# Patient Record
Sex: FEMALE | URBAN - METROPOLITAN AREA
[De-identification: names, ages, dates, MRNs, and addresses within clinical notes are randomized per-mention and may not be internally consistent; named-entity substitution may affect disease eponyms.]

---

## 2021-04-30 ENCOUNTER — OFFICE VISIT (OUTPATIENT)
Dept: URBAN - METROPOLITAN AREA CLINIC 10 | Facility: CLINIC | Age: 21
End: 2021-04-30
Payer: COMMERCIAL

## 2021-04-30 DIAGNOSIS — H02.9 DISORDER OF EYELID: Primary | ICD-10-CM

## 2021-04-30 PROCEDURE — 99203 OFFICE O/P NEW LOW 30 MIN: CPT | Performed by: OPHTHALMOLOGY

## 2021-04-30 ASSESSMENT — INTRAOCULAR PRESSURE
OD: 13
OS: 12

## 2021-04-30 ASSESSMENT — VISUAL ACUITY
OD: 20/20
OS: 20/20

## 2021-04-30 ASSESSMENT — KERATOMETRY
OD: 42.88
OS: 43.13

## 2021-04-30 NOTE — IMPRESSION/PLAN
Impression: Disorder of eyelid: H02.9. Plan: suspect resolving upper lid lesions, was on antibiotics. now first trimester pregnancy, will defer further antibiotics, suggest warm compresses and artificial tears. no obvious rosacea or significant meibomian gland inspissation though. 
[facility] consult with oculoplastics team prn

## 2021-05-10 ENCOUNTER — OFFICE VISIT (OUTPATIENT)
Dept: URBAN - METROPOLITAN AREA CLINIC 10 | Facility: CLINIC | Age: 21
End: 2021-05-10
Payer: COMMERCIAL

## 2021-05-10 PROCEDURE — 92002 INTRM OPH EXAM NEW PATIENT: CPT | Performed by: OPTOMETRIST

## 2021-06-14 ENCOUNTER — OFFICE VISIT (OUTPATIENT)
Dept: URBAN - METROPOLITAN AREA CLINIC 10 | Facility: CLINIC | Age: 21
End: 2021-06-14
Payer: COMMERCIAL

## 2021-06-14 DIAGNOSIS — H10.45 OTHER CHRONIC ALLERGIC CONJUNCTIVITIS: Primary | ICD-10-CM

## 2021-06-14 DIAGNOSIS — H02.35 BLEPHAROCHALASIS LEFT LOWER EYELID: ICD-10-CM

## 2021-06-14 DIAGNOSIS — H02.31 BLEPHAROCHALASIS RIGHT UPPER EYELID: ICD-10-CM

## 2021-06-14 PROCEDURE — 92012 INTRM OPH EXAM EST PATIENT: CPT | Performed by: OPTOMETRIST

## 2021-06-14 NOTE — IMPRESSION/PLAN
Impression: Neoplasm of uncertain behavior of connective tissue of eyelid: D48.1. Plan: Lid lesion RUL. Consult Dr. Charley Rey as scheduled.

## 2021-06-14 NOTE — IMPRESSION/PLAN
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 Plan: Improved. Cont. cool compresses and aquaphor. Pt. is pregnant.

## 2021-07-20 ENCOUNTER — OFFICE VISIT (OUTPATIENT)
Dept: URBAN - METROPOLITAN AREA CLINIC 34 | Facility: CLINIC | Age: 21
End: 2021-07-20
Payer: COMMERCIAL

## 2021-07-20 DIAGNOSIS — D48.1 NEOPLASM OF UNCERTAIN BEHAVIOR OF CONNECTIVE TISSUE OF EYELID: Primary | ICD-10-CM

## 2021-07-20 PROCEDURE — 92014 COMPRE OPH EXAM EST PT 1/>: CPT | Performed by: OPHTHALMOLOGY

## 2021-07-20 NOTE — IMPRESSION/PLAN
Impression: Neoplasm of uncertain behavior of connective tissue of eyelid: D48.1. Plan: Right upper lid/ lateral brow- subcutaneous nodule. Nodule fluctuates in size and also has intermittent episodes of upper eyelid edema that began 7 months ago. Edema lasts 2-3 days and then subsides. No changes in vision. On exam, no lid edema today, but she does have a nodule at the area of the frontozygomatic suture. Will schedule for biopsy. RBAI of biopsy/excision d/w patient, and all questions answered. 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

## 2021-08-26 ENCOUNTER — PROCEDURE (OUTPATIENT)
Dept: URBAN - METROPOLITAN AREA CLINIC 44 | Facility: CLINIC | Age: 21
End: 2021-08-26
Payer: COMMERCIAL

## 2021-08-26 PROCEDURE — 67840 REMOVE EYELID LESION: CPT | Performed by: OPHTHALMOLOGY

## 2021-08-26 RX ORDER — NEOMYCIN SULFATE, POLYMYXIN B SULFATE AND DEXAMETHASONE 3.5; 10000; 1 MG/G; [USP'U]/G; MG/G
OINTMENT OPHTHALMIC
Qty: 1 | Refills: 2 | Status: ACTIVE
Start: 2021-08-26

## 2021-09-01 ENCOUNTER — OFFICE VISIT (OUTPATIENT)
Dept: URBAN - METROPOLITAN AREA CLINIC 34 | Facility: CLINIC | Age: 21
End: 2021-09-01
Payer: COMMERCIAL

## 2021-09-01 PROCEDURE — 92012 INTRM OPH EXAM EST PATIENT: CPT | Performed by: OPHTHALMOLOGY

## 2021-09-01 NOTE — IMPRESSION/PLAN
Impression: Neoplasm of uncertain behavior of connective tissue of eyelid: D48.1. Plan: Pathology report showed a benign Pilomatricoma; Explained the results to the patient, sutures were removed. RTC in 1 month if needed to evaluate the incision area.